# Patient Record
Sex: MALE | Race: WHITE
[De-identification: names, ages, dates, MRNs, and addresses within clinical notes are randomized per-mention and may not be internally consistent; named-entity substitution may affect disease eponyms.]

---

## 2020-11-15 ENCOUNTER — HOSPITAL ENCOUNTER (EMERGENCY)
Dept: HOSPITAL 72 - EMR | Age: 30
Discharge: HOME | End: 2020-11-15
Payer: SELF-PAY

## 2020-11-15 VITALS — HEIGHT: 68 IN | WEIGHT: 150 LBS | BODY MASS INDEX: 22.73 KG/M2

## 2020-11-15 VITALS — DIASTOLIC BLOOD PRESSURE: 75 MMHG | SYSTOLIC BLOOD PRESSURE: 128 MMHG

## 2020-11-15 VITALS — SYSTOLIC BLOOD PRESSURE: 121 MMHG | DIASTOLIC BLOOD PRESSURE: 83 MMHG

## 2020-11-15 DIAGNOSIS — Z91.19: ICD-10-CM

## 2020-11-15 DIAGNOSIS — F20.9: Primary | ICD-10-CM

## 2020-11-15 DIAGNOSIS — B20: ICD-10-CM

## 2020-11-15 DIAGNOSIS — Z88.5: ICD-10-CM

## 2020-11-15 PROCEDURE — 99282 EMERGENCY DEPT VISIT SF MDM: CPT

## 2020-11-15 NOTE — NUR
ED Nurse Note:



Pt ambulated to ed due depression after losing . pt normally takes 
lexapro and depakote. no more of prescription

## 2020-11-15 NOTE — NUR
ED Nurse Note:



Pt cleared by ERPA for discharge.  DC instructions/ resources was given and 
explained to pt and verbalized understanding of teachings. All medical deviecs 
such as ID band  removed. Pt is AAO x4, ambulatory and left with all personal 
belongings. pt will take the bus.

## 2020-11-15 NOTE — EMERGENCY ROOM REPORT
History of Present Illness


General


Chief Complaint:  Behavioral Complaint


Source:  Patient





Present Illness


HPI


29-year-old male with history of schizophrenia and HIV who is noncompliant with 

taking medication and reports has not taken any medication for 5 years here 

requesting refill of his Depakote, lithium, and started on HIV medication 

reports that he was diagnosed with HIV 6 months ago and he has not taken 

medication yet.  Patient is interested in referrals to homeless shelters, HIV 

clinics, and psychiatric facilities.  Denies SI and HI.  Patient asked for food 

and oral hydration upon arrival.  Denies any pain at this time.  Denies drug 

use, alcohol intake, tobacco smoke.


Allergies:  


Coded Allergies:  


     CODEINE (Verified  Allergy, Unknown, 11/15/20)





COVID-19 Screening


COVID-19 risk:Contact w/high r:  No


Has patient experienced corona:  No


COVID-19 Testing performed PTA:  No





Patient History


Past Medical History:  see triage record


Family History:  unable to obtain


Reviewed Nursing Documentation:  PMH: Agreed; PSxH: Agreed





Nursing Documentation-PMH


Past Medical History:  No History, Except For





Review of Systems


All Other Systems:  negative except mentioned in HPI





Physical Exam





Vital Signs








  Date Time  Temp Pulse Resp B/P (MAP) Pulse Ox O2 Delivery O2 Flow Rate FiO2


 


11/15/20 13:23 97.9 108 20 121/83 (96) 100 Room Air  








Sp02 EP Interpretation:  reviewed, normal


General Appearance:  alert/responsive, no apparent distress, GCS 15, non-toxic


Head:  atraumatic


Eyes:  PERRL, lids + conjunctiva normal


ENT:  hearing intact, no angioedema


Neck:  supple/symm/no masses, no meningismus


Respiratory:  effort normal, no wheezing, chest symmetrical


Cardiovascular:  regular rate, rhythm, no edema


Cardiovascular #2:  2+ carotid (R), 2+ carotid (L), 2+ dorsalis pedis (R), 2+ 

dorsalis pedis (L)


Gastrointestinal:  non-tender, no mass, non-distended, no rebound/guarding, 

normal bowel sounds


Musculoskeletal:  gait & station normal, normal ROM, strength & tone normal, 

non-tender


Neurologic:  oriented x3, sensory intact, normal speech


Psychiatric:  judgment & insight normal, no suicidal/homicidal ideation


Skin:  no rash, well hydrated


Lymphatic:  normal inspection





Medical Decision Making


PA Attestation


All my diagnosis and treatment plans were reviewed ad discussed with my 

supervising physician Dr. Ellsworth


Diagnostic Impression:  


   Primary Impression:  


   Schizophrenia


   Additional Impression:  


   HIV (human immunodeficiency virus infection)


ER Course


29-year-old male with history of schizophrenia and HIV who is noncompliant with 

taking medication and reports has not taken any medication for 5 years here 

requesting refill of his Depakote, lithium, and started on HIV medication 

reports that he was diagnosed with HIV 6 months ago and he has not taken medica

tion yet.  Patient is interested in referrals to homeless shelters, HIV clinics,

and psychiatric facilities.  Denies SI and HI.  Patient asked for food and oral 

hydration upon arrival.  Denies any pain at this time.  Denies drug use, alcohol

intake, tobacco smoke.





Ddx considered but are not limited to: generalized anxiety disorder, panic 

attack, depression with psycotic featurs, bipolar disorder, drug overdose 














Vital signs: are WNL, pt. is afebrile








 H&PE are most consistent with: Schizophrenia, HIV














ORDERS: none required at this time, the diagnosis is clinical














ED INTERVENTIONS: None required at this time.























DISCHARGE: At this time pt. is stable for d/c to home. Will provide printed 

patient care instructions, and any necessary prescriptions. Care plan and follow

up instructions have been discussed with the patient prior to discharge.  Gave 

patient a list of facilities to follow-up with at this time it is not advised to

start lithium and Depakote on the patient was not taking them for 5 years.  Also

start of HIV medication is not routinely done at the emergency department 

however proper resources were provided for the patient.  If worsening symptoms 

return to the emergency room





Last Vital Signs








  Date Time  Temp Pulse Resp B/P (MAP) Pulse Ox O2 Delivery O2 Flow Rate FiO2


 


11/15/20 13:23 97.9 108 20 121/83 (96) 100 Room Air  








Disposition:  HOME, SELF-CARE


Condition:  Stable


Patient Instructions:  HIV Infection and AIDS, Schizophrenia











Suresh Ortiz      Nov 15, 2020 13:39

## 2020-11-19 ENCOUNTER — HOSPITAL ENCOUNTER (EMERGENCY)
Dept: HOSPITAL 72 - EMR | Age: 30
LOS: 1 days | Discharge: HOME | End: 2020-11-20
Payer: MEDICAID

## 2020-11-19 VITALS — WEIGHT: 125 LBS | HEIGHT: 68 IN | BODY MASS INDEX: 18.94 KG/M2

## 2020-11-19 VITALS — SYSTOLIC BLOOD PRESSURE: 115 MMHG | DIASTOLIC BLOOD PRESSURE: 76 MMHG

## 2020-11-19 VITALS — DIASTOLIC BLOOD PRESSURE: 72 MMHG | SYSTOLIC BLOOD PRESSURE: 113 MMHG

## 2020-11-19 DIAGNOSIS — B20: ICD-10-CM

## 2020-11-19 DIAGNOSIS — R45.851: Primary | ICD-10-CM

## 2020-11-19 DIAGNOSIS — F32.9: ICD-10-CM

## 2020-11-19 DIAGNOSIS — Z88.5: ICD-10-CM

## 2020-11-19 LAB
ADD MANUAL DIFF: NO
ALBUMIN SERPL-MCNC: 3.5 G/DL (ref 3.4–5)
ALBUMIN/GLOB SERPL: 1 {RATIO} (ref 1–2.7)
ALP SERPL-CCNC: 61 U/L (ref 46–116)
ALT SERPL-CCNC: 21 U/L (ref 12–78)
ANION GAP SERPL CALC-SCNC: 4 MMOL/L (ref 5–15)
AST SERPL-CCNC: 14 U/L (ref 15–37)
BASOPHILS NFR BLD AUTO: 0.8 % (ref 0–2)
BILIRUB SERPL-MCNC: 0.2 MG/DL (ref 0.2–1)
BUN SERPL-MCNC: 18 MG/DL (ref 7–18)
CALCIUM SERPL-MCNC: 8.7 MG/DL (ref 8.5–10.1)
CHLORIDE SERPL-SCNC: 106 MMOL/L (ref 98–107)
CO2 SERPL-SCNC: 33 MMOL/L (ref 21–32)
CREAT SERPL-MCNC: 1.3 MG/DL (ref 0.55–1.3)
EOSINOPHIL NFR BLD AUTO: 0.4 % (ref 0–3)
ERYTHROCYTE [DISTWIDTH] IN BLOOD BY AUTOMATED COUNT: 13.1 % (ref 11.6–14.8)
GLOBULIN SER-MCNC: 3.5 G/DL
HCT VFR BLD CALC: 44.4 % (ref 42–52)
HGB BLD-MCNC: 14.9 G/DL (ref 14.2–18)
LYMPHOCYTES NFR BLD AUTO: 29.5 % (ref 20–45)
MCV RBC AUTO: 94 FL (ref 80–99)
MONOCYTES NFR BLD AUTO: 4.1 % (ref 1–10)
NEUTROPHILS NFR BLD AUTO: 65.3 % (ref 45–75)
PLATELET # BLD: 262 K/UL (ref 150–450)
POTASSIUM SERPL-SCNC: 3.7 MMOL/L (ref 3.5–5.1)
RBC # BLD AUTO: 4.72 M/UL (ref 4.7–6.1)
SODIUM SERPL-SCNC: 143 MMOL/L (ref 136–145)
WBC # BLD AUTO: 10 K/UL (ref 4.8–10.8)

## 2020-11-19 PROCEDURE — 85025 COMPLETE CBC W/AUTO DIFF WBC: CPT

## 2020-11-19 PROCEDURE — 36415 COLL VENOUS BLD VENIPUNCTURE: CPT

## 2020-11-19 PROCEDURE — 99283 EMERGENCY DEPT VISIT LOW MDM: CPT

## 2020-11-19 PROCEDURE — 80053 COMPREHEN METABOLIC PANEL: CPT

## 2020-11-19 PROCEDURE — 80307 DRUG TEST PRSMV CHEM ANLYZR: CPT

## 2020-11-19 PROCEDURE — 93005 ELECTROCARDIOGRAM TRACING: CPT

## 2020-11-19 NOTE — EMERGENCY ROOM REPORT
History of Present Illness


General


Chief Complaint:  General Complaint





Present Illness


HPI


29-year-old homeless male presents today with feelings of weakness and 

dizziness.  Patient states he was diagnosed with HIV 6 months ago and he has not

taken any medications for it. He states he could not fill them due to cost.  He 

is requesting labs to check his white blood cell count. Additionally, pt is 

currently stating that he is feeling very depressed. He states he has a history 

of depression but he has not taken his medications for depression because of 

cost.  He states his partner recently  and that  that his depression has 

been increasing lately. He states that he is having thoughts of killing himself.

He states that he is feeling very depressed since he lost his partner and that 

he does not wish to live anymore. He does not have an exact plan. He denies any 

hallucinations or HI. He is requesting voluntary pysch. 





Location: Whole body


Severity: Mild


Timing: Today


Modifying Factors: see above


Associated signs and symptoms: None





PMH: Bipolar, depression, HIV


PSH: No pertinent medical history


Smoking: Cigarettes occasionally


Ethanol: [Denies]


Drug: Recent meth user


 (Carolina Collins PA-C)


Allergies:  


Coded Allergies:  


     CODEINE (Verified  Allergy, Unknown, 11/15/20)





COVID-19 Screening


Contact w/high risk pt:  No


Experienced COVID-19 symptoms?:  No


COVID-19 Testing performed PTA:  No


 (Carolina Collins PA-C)





Review of Systems


Narrative


Review of systems:


CONST: No fevers or chills, No night sweats


EYES: No eye pain, vision change, eye discharge


HEAD/EARS/NOSE/THROAT: No earache, sore throat, or nasal discharge.


PULMONARY: No SOB, no cough, no wheezing


CARDIAC: No chest pain, No palpitations, no leg swelling


GI: No abdominal pain, no vomiting, no diarrhea , no melena or BRBPR 


: No flank pain, no dysuria, no hematuria, no frequency. 


MUSCULOSKELETAL: No back pain, no neck pain, no leg pain


SKIN:  No rash, no itching, no bruising


NEUROLOGICAL: No headache, + dizziness, no paresthesia , no focal weakness but 

does complain of weakness 


14 point Review of Systems is otherwise negative except per HPI


 (Carolina Collins PA-C)





Physical Exam





Vital Signs








  Date Time  Temp Pulse Resp B/P (MAP) Pulse Ox O2 Delivery O2 Flow Rate FiO2


 


20 13:50 98.6 96 22 113/72 (86) 95 Room Air  








Other Organ Systems


Physical Exam:


GENERAL: Awake, alert, nontoxic, in no acute distress


EYES: EOMI, conjunctiva without pallor 


HEAD/EARS/NOSE/THROAT: NCAT, oral mucosa moist, external nose and ear normal in 

appearance, oropharynx clear, no swelling or exudates. 


NECK: supple, nontender, no spasm, no JVD, no cervical adenopathy


RESPIRATORY: no stridor, effort normal, no retractions, no accessory muscle use,

 BS clear bilaterally, no wheezes, no rhonchi, no rales, no rub. 


CARDIOVASCULAR: RRR, normal S1 S2, no murmur, no peripheral edema


ABDOMINAL /GI: soft, nondistended, nontender, BS normal, no masses, no guarding,

 no Mcburneys point tenderness, no rebound, no Murpheys sign


: No CVA tenderness


MUSCULOSKELETAL:  All extremities are non-tender, no swelling, FROM, normal 

strength, normal sensation, cap refill <2 seconds in all extremities


EXTREMITIES: no leg swelling, pulses: 2+ brisk and normal in all distal 

extremities


SKIN:  No rash, skin is warm and dry. No cyanosis, no pallor


NEUROLOGICAL: awake, alert and appropriate, oriented x3, speech normal, motor 

and sensation grossly intact


PSYCHIATRIC: Normal mood, normal affect


 (Carolina Collins PA-C)





Medical Decision Making


PA Attestation


Dr. Gomez Is my supervising Physician whom patient management has been 

discussed with.


 (Carolina Clolins PA-C)


Diagnostic Impression:  


   Primary Impression:  


   Suicidal ideation


ER Course


Pt. presents to the ED c/o weakness and dizziness. He is also reporting symptoms

 of depression and is requesting voluntary psychiatric evaluation and placement.





Ddx considered but are not limited to electrolyte abnormality, dehydration, 

arrhythmia, action, drug usage





Vital signs: are WNL, pt. is afebrile





H&PE are most consistent with depression. 





ED INTERVENTIONS: CBC reveals no elevated or decreased white blood cell count, 

no signs of severe anemia.  CMP reveals no acute electrolyte abnormalities.  

Toxicology screen is positive for marijuana however there is no evidence of 

salicylates overdosage or acetaminophen overdose.  Serum alcohol is negative.





No imaging required at this time.  EKG reveals no acute findings and patient is 

not reporting any chest pain or shortness of breath, therefore chest x-ray was 

not ordered.  Patient symptoms do not appear cardiac related.  Unsure cause of 

patient's dizziness however no acute findings noted today.





EKG showed no evidence of dangerous arrhythmias causing the weakness, he is ANO 

x3.  He is currently sleeping in the bed in no acute distress and vital signs 

are stable. 


This case was signed out to my supervising physician Dr. Gomez pending 

psychiatric placement.





Patient is medically cleared for psychiatric evaluation.





Laboratory Tests








Test


 20


14:25


 


White Blood Count


 10.0 K/UL


(4.8-10.8)


 


Red Blood Count


 4.72 M/UL


(4.70-6.10)


 


Hemoglobin


 14.9 G/DL


(14.2-18.0)


 


Hematocrit


 44.4 %


(42.0-52.0)


 


Mean Corpuscular Volume 94 FL (80-99)  


 


Mean Corpuscular Hemoglobin


 31.4 PG


(27.0-31.0)  H


 


Mean Corpuscular Hemoglobin


Concent 33.4 G/DL


(32.0-36.0)


 


Red Cell Distribution Width


 13.1 %


(11.6-14.8)


 


Platelet Count


 262 K/UL


(150-450)


 


Mean Platelet Volume


 7.2 FL


(6.5-10.1)


 


Neutrophils (%) (Auto)


 65.3 %


(45.0-75.0)


 


Lymphocytes (%) (Auto)


 29.5 %


(20.0-45.0)


 


Monocytes (%) (Auto)


 4.1 %


(1.0-10.0)


 


Eosinophils (%) (Auto)


 0.4 %


(0.0-3.0)


 


Basophils (%) (Auto)


 0.8 %


(0.0-2.0)


 


Sodium Level


 143 MMOL/L


(136-145)


 


Potassium Level


 3.7 MMOL/L


(3.5-5.1)


 


Chloride Level


 106 MMOL/L


()


 


Carbon Dioxide Level


 33 MMOL/L


(21-32)  H


 


Anion Gap


 4 mmol/L


(5-15)  L


 


Blood Urea Nitrogen


 18 mg/dL


(7-18)


 


Creatinine


 1.3 MG/DL


(0.55-1.30)


 


Estimated Glomerular


Filtration Rate > 60 mL/min


(>60)


 


Glucose Level


 82 MG/DL


()


 


Calcium Level


 8.7 MG/DL


(8.5-10.1)


 


Total Bilirubin


 0.2 MG/DL


(0.2-1.0)


 


Aspartate Amino Transferase


(AST) 14 U/L (15-37)


L


 


Alanine Aminotransferase (ALT)


 21 U/L (12-78)





 


Alkaline Phosphatase


 61 U/L


()


 


Total Protein


 7.0 G/DL


(6.4-8.2)


 


Albumin


 3.5 G/DL


(3.4-5.0)


 


Globulin 3.5 g/dL  


 


Albumin/Globulin Ratio 1.0 (1.0-2.7)  


 


Salicylates Level


 1.6 ug/mL


(2.8-20)  L


 


Urine Opiates Screen


 Negative


(NEGATIVE)


 


Acetaminophen Level


 < 2 MCG/ML


(10-30)  L


 


Urine Barbiturates Screen


 Negative


(NEGATIVE)


 


Phencyclidine (PCP) Screen


 Negative


(NEGATIVE)


 


Urine Amphetamines Screen


 Negative


(NEGATIVE)


 


Urine Benzodiazepines Screen


 Negative


(NEGATIVE)


 


Urine Cocaine Screen


 Negative


(NEGATIVE)


 


Urine Marijuana (THC) Screen


 Positive


(NEGATIVE)  H


 


Serum Alcohol < 3 mg/dL  








Microbiology








 Date/Time


Source Procedure


Growth Status





 


 20 17:00


Nasopharynx SARS-CoV-2 RdRp Gene Assay - Final Complete








 (Carolina Collins PA-C)


ER Course


Please see above note.


Patient signed out to me by Dr. Gomez.


Medically clear for psychiatric hospitalization.


In the past (9 years ago) took Depakote and Lexapro.  He does not want to take 

these medications at this time.


Medically clear for psychiatric hospitalization.  Awaiting placement.





Patient left room and sleep on ground.  Refused to go back to bed due to other 

patient snoring.  Agreed to take medication to help sleep.  Wellbutrin and 

Ativan ordered.


Signed out to Dr. Arenas.


 (Efren Barrientos MD)


ER Course


Assumed care of the patient from the previous provider at approximately 0600.  


Please refer to initial note for full history and physical exam.





Briefly, 29-year-old male boarding in emergency department pending psychiatric 

placement for depression and suicidal ideation.  Resting comfortably at this 

time.  He is medically cleared for transfer.


 (Enrrique Arenas MD)


ER Course


Patient was signed out to me by the previous physician Dr. Arenas.  He remained

 hemodynamically stable and neurovascularly intact.  He was clinically sober.  

He was evaluated by the psychiatrist Dr. Campbell who deemed the patient stable 

for discharge.  Patient was not expressing any suicidal ideation at this time.  

He was given a taxi voucher and instructions to go to a psychiatric hospital if 

he is feeling any feelings of suicidal ideation or homicidal ideation.  Patient 

expressed understanding.  Not a danger to himself or others at this time.  

Discharged in good condition.


 (Ramana Kelly M.D.)


EKG Diagnostic Results


Troponin ordered:  No - Pt was not complaining of chest pain, not cardiac relat

ed


EKG Time:  15:30


EP Interpretation:  No acute ST or T wave changes


Rate:  normal


Rhythm:  NSR


ST Segments:  no acute changes


ASA given to the pt in ED:  No


PA Scribe Text


This interpretation was scribed by the PA


 (Carolina Collins PA-C)





Last Vital Signs








  Date Time  Temp Pulse Resp B/P (MAP) Pulse Ox O2 Delivery O2 Flow Rate FiO2


 


20 13:50 98.6 96 22 113/72 (86) 95 Room Air  








 (Carolina Collins PA-C)





Last Vital Signs








  Date Time  Temp Pulse Resp B/P (MAP) Pulse Ox O2 Delivery O2 Flow Rate FiO2


 


20 03:59 98.6 89 18 112/70 88 Room Air  








Status:  unchanged


 (Efren Barrientos MD)


Condition:  Stable


Signed Out To:  to Dr. Gomez at 


 (Carolina Collins PA-C)











Carolina Collins PA-C            2020 14:36


Efren Barrientos MD                2020 05:30


Enrrique Arenas MD              2020 06:15


Ramana Kelly M.D.                2020 14:59

## 2020-11-19 NOTE — NUR
ED Nurse Note:pt. came from the street for psych and blood count(HIV positive) 
evaluation, pt. is A/Ox4 ambulatory with steady gait, wants to be placed to 
valuntary psych facility

## 2020-11-20 VITALS — SYSTOLIC BLOOD PRESSURE: 112 MMHG | DIASTOLIC BLOOD PRESSURE: 70 MMHG

## 2020-11-20 VITALS — SYSTOLIC BLOOD PRESSURE: 126 MMHG | DIASTOLIC BLOOD PRESSURE: 80 MMHG

## 2020-11-20 VITALS — DIASTOLIC BLOOD PRESSURE: 85 MMHG | SYSTOLIC BLOOD PRESSURE: 130 MMHG

## 2020-11-20 VITALS — SYSTOLIC BLOOD PRESSURE: 128 MMHG | DIASTOLIC BLOOD PRESSURE: 82 MMHG

## 2020-11-20 NOTE — CARDIOLOGY REPORT
--------------- APPROVED REPORT --------------





EKG Measurement

Heart [id number]

LA [id number]

[id number]

[id number]

[id number]



<Conclusion>

Normal sinus rhythm

Normal ECG

## 2020-11-20 NOTE — NUR
PT UPSET WITH OTHER PT IN ROOM SNORING  TRYING TO SLEEP ON FLOOR IN [address] DUPREE ADVISED TO MED PT FOR SLEEPPT BACK TO [facility] AND MEDS GIVEN

## 2020-11-20 NOTE — CONSULTATION
DATE OF CONSULTATION:  11/20/2020

HISTORY OF PRESENT ILLNESS:  This is a 29-year-old male with history of

HIV, psychotic disorder, and cannabis abuse.  He has been admitted to the

hospital for depressive symptoms.  He denies any suicidal or homicidal

ideation.  The patient is refusing to go to a psychiatric unit as he is

hopeless and does not have anywhere to go to.  The patient recently came

to the ER, did not follow up with outpatient clinic to get his HIV

medication or antidepressants.



PAST PSYCHIATRIC HISTORY:  No psychiatric hospitalization.  No suicide

attempt.



PAST MEDICAL HISTORY:  HIV diagnosed 6 months ago.



ALLERGIES:  Codeine.



SUBSTANCE ABUSE HISTORY:  Positive for cannabis.  He has a history of drug

use.



SOCIAL HISTORY:  The [place].  The patient

initially resided in [place], then [facility].



MENTAL STATUS EXAMINATION:  The patient is alert and oriented to times

self, place, situation, and date.  Mood is dysphoric.  Affect is blunted,

congruent with mood.  Thought process is linear and goal oriented.

Thought content, there is no suicidal or homicidal ideation.  Cognition is

intact.  Insight and judgment, is fair.



ASSESSMENT:

AXIS I:

1. Cannabis dependence.

2. Rule out depressive disorder.

AXIS II:  Deferred.

AXIS III:  HIV.

AXIS IV:  Homelessness.

AXIS V:  60



PLAN:

1. The patient is not meeting criteria for 5150.

2. He is not an imminent danger to self or others.

3. The patient will be given referral to outpatient psychiatrist.

4. The patient was told to go back to the nearest emergency room.

5. He denies any suicidal thoughts.









  ______________________________________________

  Jared Campbell M.D.





DR:  Pattie

D:  11/20/2020 15:04

T:  11/20/2020 20:40

JOB#:  [id number]

CC:

## 2020-11-20 NOTE — NUR
ED Nurse Note:



Pt was seen and evaluated by Dr. Campbell via [facility].